# Patient Record
Sex: MALE | Race: WHITE | Employment: FULL TIME | URBAN - METROPOLITAN AREA
[De-identification: names, ages, dates, MRNs, and addresses within clinical notes are randomized per-mention and may not be internally consistent; named-entity substitution may affect disease eponyms.]

---

## 2018-02-27 PROCEDURE — 81003 URINALYSIS AUTO W/O SCOPE: CPT | Performed by: INTERNAL MEDICINE

## 2019-03-30 ENCOUNTER — APPOINTMENT (OUTPATIENT)
Dept: GENERAL RADIOLOGY | Facility: HOSPITAL | Age: 59
End: 2019-03-30
Attending: EMERGENCY MEDICINE
Payer: COMMERCIAL

## 2019-03-30 ENCOUNTER — HOSPITAL ENCOUNTER (EMERGENCY)
Facility: HOSPITAL | Age: 59
Discharge: HOME | End: 2019-03-30
Attending: EMERGENCY MEDICINE
Payer: COMMERCIAL

## 2019-03-30 VITALS
RESPIRATION RATE: 16 BRPM | DIASTOLIC BLOOD PRESSURE: 76 MMHG | HEIGHT: 70 IN | SYSTOLIC BLOOD PRESSURE: 117 MMHG | WEIGHT: 196 LBS | TEMPERATURE: 98 F | OXYGEN SATURATION: 96 % | HEART RATE: 80 BPM | BODY MASS INDEX: 28.06 KG/M2

## 2019-03-30 DIAGNOSIS — E86.0 DEHYDRATION: ICD-10-CM

## 2019-03-30 DIAGNOSIS — J06.9 UPPER RESPIRATORY TRACT INFECTION, UNSPECIFIED TYPE: Primary | ICD-10-CM

## 2019-03-30 PROCEDURE — 85025 COMPLETE CBC W/AUTO DIFF WBC: CPT | Performed by: EMERGENCY MEDICINE

## 2019-03-30 PROCEDURE — 87633 RESP VIRUS 12-25 TARGETS: CPT | Performed by: EMERGENCY MEDICINE

## 2019-03-30 PROCEDURE — 93010 ELECTROCARDIOGRAM REPORT: CPT

## 2019-03-30 PROCEDURE — 85378 FIBRIN DEGRADE SEMIQUANT: CPT | Performed by: EMERGENCY MEDICINE

## 2019-03-30 PROCEDURE — 99285 EMERGENCY DEPT VISIT HI MDM: CPT

## 2019-03-30 PROCEDURE — 71046 X-RAY EXAM CHEST 2 VIEWS: CPT | Performed by: EMERGENCY MEDICINE

## 2019-03-30 PROCEDURE — 87999 UNLISTED MICROBIOLOGY PX: CPT

## 2019-03-30 PROCEDURE — 93005 ELECTROCARDIOGRAM TRACING: CPT

## 2019-03-30 PROCEDURE — 87486 CHLMYD PNEUM DNA AMP PROBE: CPT | Performed by: EMERGENCY MEDICINE

## 2019-03-30 PROCEDURE — 87581 M.PNEUMON DNA AMP PROBE: CPT | Performed by: EMERGENCY MEDICINE

## 2019-03-30 PROCEDURE — 87798 DETECT AGENT NOS DNA AMP: CPT | Performed by: EMERGENCY MEDICINE

## 2019-03-30 PROCEDURE — 84484 ASSAY OF TROPONIN QUANT: CPT | Performed by: EMERGENCY MEDICINE

## 2019-03-30 PROCEDURE — 80053 COMPREHEN METABOLIC PANEL: CPT | Performed by: EMERGENCY MEDICINE

## 2019-03-30 PROCEDURE — 96360 HYDRATION IV INFUSION INIT: CPT

## 2019-03-30 NOTE — ED PROVIDER NOTES
Patient Seen in: [facility] Emergency Department    History   Patient presents with:  Fall (musculoskeletal, neurologic)    Stated Complaint: syncope    HPI    The patient is a 21-year-old male with a h/o bronchitis and pneumonia who recently return BMI 28.12 kg/m²         Physical Exam    General: Well-developed, well-nourished adult male who is pleasant, conversant, nontoxic, comfortable and well appearing. Alert and oriented in no distress but he is coughing and intermittent coarse sounding cough. Normal    Narrative:     FEU = Fibrinogen Equivalent Units.     D-Dimer results of less than 0.5 ug/mL (FEU) have been shown to contribute to the exclusion of venous thromboembolism with a negative predictive value of approximately 95% when results are used silhouette. MEDIASTINUM:  Normal.    PLEURA:  Normal.  No pleural effusions. BONES:  Normal for age.         =====    CONCLUSION:  Linear opacity in the left lower lobe is likely due to     atelectasis. No definite consolidation.   Follow-up to resol

## 2019-03-30 NOTE — ED INITIAL ASSESSMENT (HPI)
Pt c/o feeling exhausted, cough and feeling like he was going to pass out. Pt states he thought he had bronchitis but usually doesn't have the passing out sensation. Pt returned from [place] on Sunday.

## 2019-03-30 NOTE — ED NOTES
Pt arrives via EMS syncopal episode @ PCP office. Was laying down when passed out. Witnessed. Denies head, neck, back pain.

## (undated) NOTE — ED AVS SNAPSHOT
Samira Guthrie   MRN: [id number]    Department:  [facility] Emergency Department   Date of Visit:  3/30/2019           Disclosure     Insurance plans vary and the physician(s) referred by the ER may not be covered by your plan.  Please contact tell this physician (or your personal doctor if your instructions are to return to your personal doctor) about any new or lasting problems. The primary care or specialist physician will see patients referred from the [facility] Emergency Department.  Doni Grissom